# Patient Record
Sex: FEMALE | Race: BLACK OR AFRICAN AMERICAN | NOT HISPANIC OR LATINO | Employment: FULL TIME | URBAN - METROPOLITAN AREA
[De-identification: names, ages, dates, MRNs, and addresses within clinical notes are randomized per-mention and may not be internally consistent; named-entity substitution may affect disease eponyms.]

---

## 2024-10-09 ENCOUNTER — OFFICE VISIT (OUTPATIENT)
Dept: URGENT CARE | Age: 19
End: 2024-10-09
Payer: MEDICAID

## 2024-10-09 ENCOUNTER — HOSPITAL ENCOUNTER (OUTPATIENT)
Dept: RADIOLOGY | Facility: CLINIC | Age: 19
Discharge: HOME | End: 2024-10-09
Payer: MEDICAID

## 2024-10-09 VITALS
WEIGHT: 220 LBS | SYSTOLIC BLOOD PRESSURE: 106 MMHG | DIASTOLIC BLOOD PRESSURE: 74 MMHG | TEMPERATURE: 98.1 F | OXYGEN SATURATION: 97 %

## 2024-10-09 DIAGNOSIS — S93.402A MODERATE ANKLE SPRAIN, LEFT, INITIAL ENCOUNTER: Primary | ICD-10-CM

## 2024-10-09 DIAGNOSIS — S99.912A LEFT ANKLE INJURY, INITIAL ENCOUNTER: ICD-10-CM

## 2024-10-09 PROCEDURE — 1036F TOBACCO NON-USER: CPT | Performed by: PHYSICIAN ASSISTANT

## 2024-10-09 PROCEDURE — L4350 ANKLE CONTROL ORTHO PRE OTS: HCPCS | Performed by: PHYSICIAN ASSISTANT

## 2024-10-09 PROCEDURE — 99203 OFFICE O/P NEW LOW 30 MIN: CPT | Performed by: PHYSICIAN ASSISTANT

## 2024-10-09 PROCEDURE — 73610 X-RAY EXAM OF ANKLE: CPT | Mod: LT

## 2024-10-09 NOTE — PROGRESS NOTES
Subjective   Patient ID: Ade Zavala is a 18 y.o. female. They present today with a chief complaint of Injury (Left Ankle -).    History of Present Illness  HPI  Patient presents to the urgent care is a 18-year-old female after injuring her left ankle 3 times in the past month.  Patient states she has heard a crack each time she injured it, but has been able to walk on it each time after.  Patient states she has noticed swelling on the outside of her ankle.  Patient's most recent injury was today, while walking on concrete, prior injury was 1 week ago, and first injury was approximately 1 month ago.  Patient states she has sprained the same ankle approximately 1 to 2 years ago.  Denies any prior fractures.  Patient reports her pain with weightbearing and 7/10.  Past Medical History  Allergies as of 10/09/2024    (No Known Allergies)       (Not in a hospital admission)       History reviewed. No pertinent past medical history.    History reviewed. No pertinent surgical history.     reports that she has never smoked. She has never used smokeless tobacco. She reports current alcohol use. She reports that she does not use drugs.    Review of Systems  Review of Systems  Patient denies numbness, tingling, changes in ROM, weakness, sore throat, fever, rash, dizziness, shortness of breath, increased urinary frequency, chills, abdominal pain, chest pain.                             Objective    Vitals:    10/09/24 1754   BP: 106/74   Temp: 36.7 °C (98.1 °F)   SpO2: 97%   Weight: 99.8 kg (220 lb)     Patient's last menstrual period was 09/26/2024.    Physical Exam  Appearance: Alert, oriented, cooperative, in no acute distress. Well nourished & well hydrated.    Skin: No petechiae or purpura.     Eyes: Conjunctiva pink with no redness or exudates. Eyelids without lesions. No scleral icterus.     ENT: Hearing grossly intact. External inspection of ears without lesions or erythema. no nasal flaring.    Pulmonary: Good chest  wall excursion. No accessory muscle use or stridor.    Cardiac: No JVD.     Musculoskeletal: no deformity. No cyanosis, clubbing. Sensation 5/5 distal in digits, cap. fill < 2 sec distally in LLE, left post tib pulses +2, FROM of LLE, strength 5/5 of LE b/l, no erythema, mild edema inferior to lateral malleolus, no tenderness to palpation of proximal/mid tibia and fibula, no tenderness to palpation of 5th metatarsal or metatarsals 1-4, tenderness to palpation surrounding lateral malleolus, no tenderness to palpation of anterior or medial ankle, no calf tenderness to firm palpation, no tenderness w dorsi / plantar flexion, no breaks in skin, no ecchymosis, neurovascular intact.     Neurological: no focal findings identified.    Psychiatric: Appropriate mood and affect.    Procedures    Point of Care Test & Imaging Results from this visit  No results found for this visit on 10/09/24.   No results found.    Diagnostic study results (if any) were reviewed by Mary Torres PA-C.    Assessment/Plan   Allergies, medications, history, and pertinent labs/EKGs/Imaging reviewed by Mary Torres PA-C.     Medical Decision Making  Considerations for patient's symptoms include sprain/strain, fracture.   Preliminary review of x-ray no acute fractures or dislocations  LVM for patient regarding interpretation.   Advised RICE therapy and follow up with orthopedic physician. If symptoms are not improving or if they are worsening the patient will call their primary care physician and go to the ER. Patient verbalizes understanding and agrees with plan of care. All questions were answered.  Dictation software was used in the creation of this note which does not evaluate or correct for typographical, spelling, syntax or grammatical errors.      Orders and Diagnoses  There are no diagnoses linked to this encounter.    Medical Admin Record      Patient disposition: Home    Electronically signed by Mary Torres PA-C  6:05  PM

## 2024-10-25 ENCOUNTER — APPOINTMENT (OUTPATIENT)
Dept: RADIOLOGY | Facility: HOSPITAL | Age: 19
End: 2024-10-25
Payer: MEDICAID

## 2025-01-30 ENCOUNTER — HOSPITAL ENCOUNTER (EMERGENCY)
Facility: HOSPITAL | Age: 20
Discharge: HOME | End: 2025-01-30
Attending: INTERNAL MEDICINE
Payer: MEDICAID

## 2025-01-30 ENCOUNTER — APPOINTMENT (OUTPATIENT)
Dept: CARDIOLOGY | Facility: HOSPITAL | Age: 20
End: 2025-01-30
Payer: MEDICAID

## 2025-01-30 VITALS
HEIGHT: 68 IN | WEIGHT: 220 LBS | TEMPERATURE: 98.3 F | RESPIRATION RATE: 18 BRPM | SYSTOLIC BLOOD PRESSURE: 129 MMHG | BODY MASS INDEX: 33.34 KG/M2 | HEART RATE: 77 BPM | OXYGEN SATURATION: 100 % | DIASTOLIC BLOOD PRESSURE: 74 MMHG

## 2025-01-30 DIAGNOSIS — F41.0 PANIC ATTACK: Primary | ICD-10-CM

## 2025-01-30 LAB
ALBUMIN SERPL BCP-MCNC: 3.7 G/DL (ref 3.4–5)
ALP SERPL-CCNC: 52 U/L (ref 33–110)
ALT SERPL W P-5'-P-CCNC: 13 U/L (ref 7–45)
ANION GAP SERPL CALC-SCNC: 10 MMOL/L (ref 10–20)
AST SERPL W P-5'-P-CCNC: 15 U/L (ref 9–39)
ATRIAL RATE: 59 BPM
B-HCG SERPL-ACNC: <2 MIU/ML
BASOPHILS # BLD AUTO: 0.05 X10*3/UL (ref 0–0.1)
BASOPHILS NFR BLD AUTO: 0.7 %
BILIRUB SERPL-MCNC: 0.4 MG/DL (ref 0–1.2)
BUN SERPL-MCNC: 15 MG/DL (ref 6–23)
CALCIUM SERPL-MCNC: 9.1 MG/DL (ref 8.6–10.3)
CHLORIDE SERPL-SCNC: 107 MMOL/L (ref 98–107)
CO2 SERPL-SCNC: 26 MMOL/L (ref 21–32)
CREAT SERPL-MCNC: 1.03 MG/DL (ref 0.5–1.05)
EGFRCR SERPLBLD CKD-EPI 2021: 80 ML/MIN/1.73M*2
EOSINOPHIL # BLD AUTO: 0.12 X10*3/UL (ref 0–0.7)
EOSINOPHIL NFR BLD AUTO: 1.7 %
ERYTHROCYTE [DISTWIDTH] IN BLOOD BY AUTOMATED COUNT: 15.9 % (ref 11.5–14.5)
GLUCOSE SERPL-MCNC: 98 MG/DL (ref 74–99)
HCT VFR BLD AUTO: 38.1 % (ref 36–46)
HGB BLD-MCNC: 12 G/DL (ref 12–16)
IMM GRANULOCYTES # BLD AUTO: 0.03 X10*3/UL (ref 0–0.7)
IMM GRANULOCYTES NFR BLD AUTO: 0.4 % (ref 0–0.9)
LYMPHOCYTES # BLD AUTO: 2.35 X10*3/UL (ref 1.2–4.8)
LYMPHOCYTES NFR BLD AUTO: 34.3 %
MCH RBC QN AUTO: 25.1 PG (ref 26–34)
MCHC RBC AUTO-ENTMCNC: 31.5 G/DL (ref 32–36)
MCV RBC AUTO: 80 FL (ref 80–100)
MONOCYTES # BLD AUTO: 0.58 X10*3/UL (ref 0.1–1)
MONOCYTES NFR BLD AUTO: 8.5 %
NEUTROPHILS # BLD AUTO: 3.73 X10*3/UL (ref 1.2–7.7)
NEUTROPHILS NFR BLD AUTO: 54.4 %
NRBC BLD-RTO: 0 /100 WBCS (ref 0–0)
P AXIS: 15 DEGREES
P OFFSET: 205 MS
P ONSET: 155 MS
PLATELET # BLD AUTO: 270 X10*3/UL (ref 150–450)
POTASSIUM SERPL-SCNC: 4.5 MMOL/L (ref 3.5–5.3)
PR INTERVAL: 138 MS
PROT SERPL-MCNC: 6.9 G/DL (ref 6.4–8.2)
Q ONSET: 224 MS
QRS COUNT: 10 BEATS
QRS DURATION: 84 MS
QT INTERVAL: 408 MS
QTC CALCULATION(BAZETT): 403 MS
QTC FREDERICIA: 405 MS
R AXIS: 80 DEGREES
RBC # BLD AUTO: 4.79 X10*6/UL (ref 4–5.2)
SODIUM SERPL-SCNC: 138 MMOL/L (ref 136–145)
T AXIS: 34 DEGREES
T OFFSET: 428 MS
VENTRICULAR RATE: 59 BPM
WBC # BLD AUTO: 6.9 X10*3/UL (ref 4.4–11.3)

## 2025-01-30 PROCEDURE — 80053 COMPREHEN METABOLIC PANEL: CPT | Performed by: INTERNAL MEDICINE

## 2025-01-30 PROCEDURE — 99284 EMERGENCY DEPT VISIT MOD MDM: CPT | Performed by: INTERNAL MEDICINE

## 2025-01-30 PROCEDURE — 36415 COLL VENOUS BLD VENIPUNCTURE: CPT | Performed by: INTERNAL MEDICINE

## 2025-01-30 PROCEDURE — 93005 ELECTROCARDIOGRAM TRACING: CPT

## 2025-01-30 PROCEDURE — 84702 CHORIONIC GONADOTROPIN TEST: CPT | Performed by: INTERNAL MEDICINE

## 2025-01-30 PROCEDURE — 85025 COMPLETE CBC W/AUTO DIFF WBC: CPT | Performed by: INTERNAL MEDICINE

## 2025-01-30 PROCEDURE — 2500000001 HC RX 250 WO HCPCS SELF ADMINISTERED DRUGS (ALT 637 FOR MEDICARE OP): Performed by: INTERNAL MEDICINE

## 2025-01-30 RX ORDER — HYDROXYZINE HYDROCHLORIDE 25 MG/1
25 TABLET, FILM COATED ORAL EVERY 6 HOURS PRN
Qty: 12 TABLET | Refills: 0 | Status: SHIPPED | OUTPATIENT
Start: 2025-01-30 | End: 2025-02-02

## 2025-01-30 RX ORDER — HYDROXYZINE HYDROCHLORIDE 25 MG/1
25 TABLET, FILM COATED ORAL ONCE
Status: COMPLETED | OUTPATIENT
Start: 2025-01-30 | End: 2025-01-30

## 2025-01-30 RX ADMIN — HYDROXYZINE HYDROCHLORIDE 25 MG: 25 TABLET ORAL at 04:43

## 2025-01-30 SDOH — HEALTH STABILITY: MENTAL HEALTH: SUICIDE ASSESSMENT: ADULT (C-SSRS)

## 2025-01-30 SDOH — HEALTH STABILITY: MENTAL HEALTH: BEHAVIORAL HEALTH(WDL): WITHIN DEFINED LIMITS

## 2025-01-30 SDOH — HEALTH STABILITY: MENTAL HEALTH: HAVE YOU WISHED YOU WERE DEAD OR WISHED YOU COULD GO TO SLEEP AND NOT WAKE UP?: NO

## 2025-01-30 SDOH — HEALTH STABILITY: MENTAL HEALTH: HAVE YOU EVER DONE ANYTHING, STARTED TO DO ANYTHING, OR PREPARED TO DO ANYTHING TO END YOUR LIFE?: NO

## 2025-01-30 SDOH — HEALTH STABILITY: MENTAL HEALTH: HAVE YOU ACTUALLY HAD ANY THOUGHTS OF KILLING YOURSELF?: NO

## 2025-01-30 ASSESSMENT — COLUMBIA-SUICIDE SEVERITY RATING SCALE - C-SSRS
6. HAVE YOU EVER DONE ANYTHING, STARTED TO DO ANYTHING, OR PREPARED TO DO ANYTHING TO END YOUR LIFE?: NO
2. HAVE YOU ACTUALLY HAD ANY THOUGHTS OF KILLING YOURSELF?: NO
1. IN THE PAST MONTH, HAVE YOU WISHED YOU WERE DEAD OR WISHED YOU COULD GO TO SLEEP AND NOT WAKE UP?: NO

## 2025-01-30 ASSESSMENT — PAIN SCALES - GENERAL: PAINLEVEL_OUTOF10: 0 - NO PAIN

## 2025-01-30 ASSESSMENT — PAIN - FUNCTIONAL ASSESSMENT: PAIN_FUNCTIONAL_ASSESSMENT: 0-10

## 2025-01-30 NOTE — DISCHARGE INSTRUCTIONS
You were seen today for concern for panic attack.  Your evaluation was not concerning for an emergency at this time.  We gave you prescription medication for an antihistamine which also works as an anxiety medication.  We gave you a list of outpatient mental health resources.  Please talk to your primary care physician this week about your symptoms.  Please see the attached information sheet for information about your condition, how to care for your condition at home, and reasons to return to the emergency department. Take any prescriptions written today as prescribed. You should call your primary care provider within 24 hours to tell them about today's visit, including any new medications or medication changes, as he or she may want to see you in the office for further evaluation. If you do not have a primary care provider, call  [phone] for an appointment. We offer in-person office visits as well as virtual options. Please do not hesitate to call  675 or return to the emergency department with any new or unresolved concerns or symptoms. Thank you for choosing [facility] for your care.

## 2025-01-30 NOTE — Clinical Note
Ade Zavala was seen and treated in our emergency department on 1/30/2025.  She may return to work on 01/31/2025.       If you have any questions or concerns, please don't hesitate to call.      Ernestina Graham MD

## 2025-02-01 NOTE — ED PROVIDER NOTES
HPI     CC: Anxiety (Denies HI/SI, states frequent crying and unable to sleep.)     HPI: Ade Zavala is a 19 y.o. female with no significant past medical history presents with her mother for concern for panic attack.  Patient states that she has been under significant stressors lately, has been filled with excessive worry and has not been sleeping well.  Whenever she tries to fall asleep she gets an uneasy feeling and gets nervous about falling asleep because she gets sleep paralysis when she tries.  The symptoms are new as of this week.  She denies any inciting event or traumatic event.  This evening she was just laying there watching TV when she suddenly started crying and could not breathe.  Symptoms lasted a while but are now resolved.  She denies any impending sense of doom.  She denies chest pain, nausea, vomiting, abdominal pain, dysuria, hematuria, or other symptoms.  She does not use alcohol or drugs except that she does smoke weed.  She has never been diagnosed with depression or anxiety before.  She is not in counseling.  She denies SI, HI, or hallucinations.  She would like something to help her calm down and is amenable to mental health resources.    ROS: 10-point review of systems was performed and is otherwise negative except as noted in HPI.    Limitations to history: N/A    Independent Historians: Mother at bedside    External Records Reviewed: Outpatient notes in EMR    Past Medical History: Noncontributory except per HPI     Past Surgical History: Noncontributory except per HPI     Family History: Reviewed and noncontributory     Social History:  Denies tobacco. Denies ETOH.  Smokes weed.    Social Determinants Affecting Care: N/A    No Known Allergies    Home Meds:   Current Outpatient Medications   Medication Instructions    hydrOXYzine HCL (ATARAX) 25 mg, oral, Every 6 hours PRN        Physical Exam     ED Triage Vitals   Temperature Heart Rate Respirations BP   01/30/25 0339 01/30/25 0339  01/30/25 0339 01/30/25 0339   36.8 °C (98.3 °F) 73 16 126/78      Pulse Ox Temp Source Heart Rate Source Patient Position   01/30/25 0339 01/30/25 0339 01/30/25 0451 01/30/25 0339   100 % Temporal Monitor Sitting      BP Location FiO2 (%)     01/30/25 0339 --     Left arm                Physical Exam  Vitals and nursing note reviewed.     CONSTITUTIONAL: Well appearing, well nourished, in no acute distress.   HENMT: Head atraumatic. Airway patent. Nasal mucosa clear. Mouth with normal mucosa, clear oropharynx. Uvula midline. Neck supple.    EYES: Clear bilaterally, pupils equally round and reactive to light.   CARDIOVASCULAR: Normal rate, regular rhythm.  Heart sounds S1, S2.  No murmurs, rubs or gallops. Normal pulses. Capillary refill < 2 sec.   RESPIRATORY: No increased work of breathing. Breath sounds clear and equal bilaterally.  GASTROINTESTINAL: Abdomen soft, non-distended, non-tender. No rebound, no guarding. Normal bowel sounds. No palpable masses.  GENITOURINARY:  No CVA tenderness.  MUSCULOSKELETAL: Spine appears normal, range of motion is not limited, no muscle or joint tenderness. No edema.   NEUROLOGICAL: Alert and oriented, no asymmetry, moving all extremities equally.  SKIN: Warm, dry and intact. No rash or notable lesions.  PSYCHIATRIC: Normal mood and affect.  HEME/LYMPH: No adenopathy or splenomegaly.    Diagnostic Results      ECG: ECGs read and interpreted by me. See ED Course, below, for interpretation.    Labs Reviewed   CBC WITH AUTO DIFFERENTIAL - Abnormal       Result Value    WBC 6.9      nRBC 0.0      RBC 4.79      Hemoglobin 12.0      Hematocrit 38.1      MCV 80      MCH 25.1 (*)     MCHC 31.5 (*)     RDW 15.9 (*)     Platelets 270      Neutrophils % 54.4      Immature Granulocytes %, Automated 0.4      Lymphocytes % 34.3      Monocytes % 8.5      Eosinophils % 1.7      Basophils % 0.7      Neutrophils Absolute 3.73      Immature Granulocytes Absolute, Automated 0.03      Lymphocytes  Absolute 2.35      Monocytes Absolute 0.58      Eosinophils Absolute 0.12      Basophils Absolute 0.05     COMPREHENSIVE METABOLIC PANEL - Normal    Glucose 98      Sodium 138      Potassium 4.5      Chloride 107      Bicarbonate 26      Anion Gap 10      Urea Nitrogen 15      Creatinine 1.03      eGFR 80      Calcium 9.1      Albumin 3.7      Alkaline Phosphatase 52      Total Protein 6.9      AST 15      Bilirubin, Total 0.4      ALT 13     HUMAN CHORIONIC GONADOTROPIN, SERUM QUANTITATIVE - Normal    HCG, Beta-Quantitative <2      Narrative:      Total HCG measurement is performed using the [place] [employer]   Immunoassay which detects intact HCG and free beta HCG subunit.    This test is not indicated for use as a tumor marker.   HCG testing is performed using a different test methodology at [facility] than other [facility]. Direct result comparison   should only be made within the same method.             No orders to display                 [place] Coma Scale Score: 15                  Procedure  Procedures    ED Course & MDM   Assessment/Plan:   Ade Zavala is a 19 y.o. female with no significant past medical history presents with her mother for concern for panic attack.  Patient has been having excessive worry and insomnia in the past week.  She had an episode of crying and shortness of breath earlier today that was seemingly unprovoked.  She is feeling better now but does still feel anxious.  Labs are unremarkable and she is not pregnant.  She was given hydroxyzine with good effect.  She was given a prescription for hydroxyzine on discharge as well as behavioral health resources.  All questions were answered and they are amenable to the plan.  She was advised to follow-up with her primary care physician within the week as she may benefit from SSRI or other anxiety medication.  Patient was discharged home with anticipatory guidance and strict return precautions.    Medications    hydrOXYzine HCL (Atarax) tablet 25 mg (25 mg oral Given 1/30/25 0443)        Diagnoses as of 01/31/25 2104   Panic attack       Disposition:   DISCHARGE.  The patient was discharged with both verbal and written anticipatory guidance and strict return precautions. Discharge diagnosis, instructions and plan were discussed and understood. I emphasized the importance of following up with their primary care provider within 24-48 hours and with any referrals in the timeframe recommended. At the time of discharge the patient was comfortable and was in no apparent distress. Patient is aware of diagnostic uncertainty and was notified though testing is negative here, there is a very small chance that pathology may be missed.  The patient understands these risks and the patient/family understood to call 911 or return immediately to the emergency department if the symptoms worsen or if they have any additional concerns.      FOLLOW UP  Primary care provider in 1-2 days.      ED Prescriptions       Medication Sig Dispense Start Date End Date Auth. Provider    hydrOXYzine HCL (Atarax) 25 mg tablet Take 1 tablet (25 mg) by mouth every 6 hours if needed for anxiety for up to 3 days. 12 tablet 1/30/2025 2/2/2025 MD Ernestina Gamboa MD  EM/IM/Peds    This note was dictated by speech recognition. Minor errors in transcription may be present.     Ernestina Graham MD  01/31/25 2108

## 2025-02-23 LAB
ATRIAL RATE: 59 BPM
P AXIS: 15 DEGREES
P OFFSET: 205 MS
P ONSET: 155 MS
PR INTERVAL: 138 MS
Q ONSET: 224 MS
QRS COUNT: 10 BEATS
QRS DURATION: 84 MS
QT INTERVAL: 408 MS
QTC CALCULATION(BAZETT): 403 MS
QTC FREDERICIA: 405 MS
R AXIS: 80 DEGREES
T AXIS: 34 DEGREES
T OFFSET: 428 MS
VENTRICULAR RATE: 59 BPM